# Patient Record
Sex: MALE | Race: WHITE | NOT HISPANIC OR LATINO | URBAN - METROPOLITAN AREA
[De-identification: names, ages, dates, MRNs, and addresses within clinical notes are randomized per-mention and may not be internally consistent; named-entity substitution may affect disease eponyms.]

---

## 2017-02-08 ENCOUNTER — OFFICE (OUTPATIENT)
Dept: URBAN - METROPOLITAN AREA CLINIC 11 | Facility: CLINIC | Age: 47
End: 2017-02-08

## 2017-02-08 VITALS
WEIGHT: 216 LBS | SYSTOLIC BLOOD PRESSURE: 147 MMHG | DIASTOLIC BLOOD PRESSURE: 100 MMHG | HEART RATE: 73 BPM | HEIGHT: 71 IN

## 2017-02-08 DIAGNOSIS — K21.9 GASTRO-ESOPHAGEAL REFLUX DISEASE WITHOUT ESOPHAGITIS: ICD-10-CM

## 2017-02-08 DIAGNOSIS — R19.4 CHANGE IN BOWEL HABIT: ICD-10-CM

## 2017-02-08 DIAGNOSIS — E66.9 OBESITY, UNSPECIFIED: ICD-10-CM

## 2017-02-08 DIAGNOSIS — K62.5 HEMORRHAGE OF ANUS AND RECTUM: ICD-10-CM

## 2017-02-08 PROCEDURE — 99203 OFFICE O/P NEW LOW 30 MIN: CPT | Performed by: INTERNAL MEDICINE

## 2017-02-08 RX ORDER — SODIUM PICOSULFATE, MAGNESIUM OXIDE, AND ANHYDROUS CITRIC ACID 10; 3.5; 12 MG/16.1G; G/16.1G; G/16.1G
POWDER, METERED ORAL
Qty: 1 | Refills: 0 | Status: COMPLETED
Start: 2017-02-08 | End: 2017-03-06

## 2017-03-06 ENCOUNTER — AMBULATORY SURGICAL CENTER (OUTPATIENT)
Dept: URBAN - METROPOLITAN AREA SURGERY 3 | Facility: SURGERY | Age: 47
End: 2017-03-06

## 2017-03-06 ENCOUNTER — OFFICE (OUTPATIENT)
Dept: URBAN - METROPOLITAN AREA CLINIC 11 | Facility: CLINIC | Age: 47
End: 2017-03-06

## 2017-03-06 VITALS
HEART RATE: 84 BPM | HEART RATE: 88 BPM | HEART RATE: 88 BPM | WEIGHT: 212 LBS | OXYGEN SATURATION: 97 % | HEART RATE: 78 BPM | SYSTOLIC BLOOD PRESSURE: 138 MMHG | DIASTOLIC BLOOD PRESSURE: 90 MMHG | WEIGHT: 212 LBS | HEIGHT: 71 IN | OXYGEN SATURATION: 95 % | HEIGHT: 71 IN | DIASTOLIC BLOOD PRESSURE: 90 MMHG | DIASTOLIC BLOOD PRESSURE: 89 MMHG | TEMPERATURE: 98 F | SYSTOLIC BLOOD PRESSURE: 122 MMHG | TEMPERATURE: 98 F | TEMPERATURE: 98.1 F | DIASTOLIC BLOOD PRESSURE: 89 MMHG | HEART RATE: 84 BPM | SYSTOLIC BLOOD PRESSURE: 127 MMHG | HEART RATE: 80 BPM | OXYGEN SATURATION: 98 % | HEART RATE: 80 BPM | DIASTOLIC BLOOD PRESSURE: 76 MMHG | HEART RATE: 78 BPM | RESPIRATION RATE: 20 BRPM | SYSTOLIC BLOOD PRESSURE: 124 MMHG | RESPIRATION RATE: 20 BRPM | SYSTOLIC BLOOD PRESSURE: 133 MMHG | OXYGEN SATURATION: 98 % | DIASTOLIC BLOOD PRESSURE: 88 MMHG | OXYGEN SATURATION: 95 % | SYSTOLIC BLOOD PRESSURE: 124 MMHG | DIASTOLIC BLOOD PRESSURE: 78 MMHG | HEART RATE: 89 BPM | DIASTOLIC BLOOD PRESSURE: 88 MMHG | OXYGEN SATURATION: 97 % | DIASTOLIC BLOOD PRESSURE: 76 MMHG | SYSTOLIC BLOOD PRESSURE: 138 MMHG | SYSTOLIC BLOOD PRESSURE: 133 MMHG | TEMPERATURE: 98.1 F | SYSTOLIC BLOOD PRESSURE: 127 MMHG | DIASTOLIC BLOOD PRESSURE: 78 MMHG | RESPIRATION RATE: 16 BRPM | SYSTOLIC BLOOD PRESSURE: 122 MMHG | HEART RATE: 89 BPM | RESPIRATION RATE: 16 BRPM

## 2017-03-06 DIAGNOSIS — K57.30 DIVERTICULOSIS OF LARGE INTESTINE WITHOUT PERFORATION OR ABS: ICD-10-CM

## 2017-03-06 DIAGNOSIS — K62.5 HEMORRHAGE OF ANUS AND RECTUM: ICD-10-CM

## 2017-03-06 DIAGNOSIS — R19.8 OTHER SPECIFIED SYMPTOMS AND SIGNS INVOLVING THE DIGESTIVE S: ICD-10-CM

## 2017-03-06 DIAGNOSIS — K64.1 SECOND DEGREE HEMORRHOIDS: ICD-10-CM

## 2017-03-06 PROBLEM — K92.2 EVALUATION OF UNEXPLAINED GI BLEEDING: Status: ACTIVE | Noted: 2017-03-06

## 2017-03-06 PROCEDURE — 88305 TISSUE EXAM BY PATHOLOGIST: CPT | Performed by: INTERNAL MEDICINE

## 2017-03-06 PROCEDURE — 45380 COLONOSCOPY AND BIOPSY: CPT | Performed by: INTERNAL MEDICINE

## 2017-03-06 NOTE — SERVICEHPINOTES
46-year-old white male referred by Dr. Larson with a history of some rectal bleeding intermittently at least for the last few weeks with bright red blood only. He notices this on the tissue and occasionally in the bowl. He has had a change in bowel function over the last couple years with increased frequency of stool and loose stools and diarrhea at times. He's been on Prilosec OTC 20 mg daily long-term for reflux issues, but only over the last 2 years has had loose stools with increased frequency to 2-3 times per day. There has been no weight loss. He describes some rectal seepage and discomfort with some urgency of stool over the last few weeks. In particular and some swelling on one side of his rectum, which may be hemorrhoid. He denies any tenesmus or any significant pain with a bowel movement. He just has some rectal itching.